# Patient Record
Sex: MALE | Race: OTHER | Employment: FULL TIME | URBAN - METROPOLITAN AREA
[De-identification: names, ages, dates, MRNs, and addresses within clinical notes are randomized per-mention and may not be internally consistent; named-entity substitution may affect disease eponyms.]

---

## 2017-10-28 ENCOUNTER — HOSPITAL ENCOUNTER (EMERGENCY)
Age: 27
Discharge: HOME | End: 2017-10-28
Attending: EMERGENCY MEDICINE | Admitting: EMERGENCY MEDICINE
Payer: SELF-PAY

## 2017-10-28 ENCOUNTER — APPOINTMENT (OUTPATIENT)
Dept: CT IMAGING | Age: 27
End: 2017-10-28
Attending: PHYSICIAN ASSISTANT
Payer: SELF-PAY

## 2017-10-28 VITALS
HEART RATE: 80 BPM | SYSTOLIC BLOOD PRESSURE: 126 MMHG | RESPIRATION RATE: 16 BRPM | WEIGHT: 139.2 LBS | TEMPERATURE: 98 F | OXYGEN SATURATION: 100 % | DIASTOLIC BLOOD PRESSURE: 79 MMHG

## 2017-10-28 DIAGNOSIS — S05.01XA CONJUNCTIVAL ABRASION, RIGHT, INITIAL ENCOUNTER: Primary | ICD-10-CM

## 2017-10-28 PROCEDURE — 90471 IMMUNIZATION ADMIN: CPT

## 2017-10-28 PROCEDURE — 90715 TDAP VACCINE 7 YRS/> IM: CPT | Performed by: PHYSICIAN ASSISTANT

## 2017-10-28 PROCEDURE — 74011000250 HC RX REV CODE- 250: Performed by: PHYSICIAN ASSISTANT

## 2017-10-28 PROCEDURE — 99282 EMERGENCY DEPT VISIT SF MDM: CPT

## 2017-10-28 PROCEDURE — 70480 CT ORBIT/EAR/FOSSA W/O DYE: CPT

## 2017-10-28 PROCEDURE — 74011250636 HC RX REV CODE- 250/636: Performed by: PHYSICIAN ASSISTANT

## 2017-10-28 RX ORDER — TRAMADOL HYDROCHLORIDE 50 MG/1
50 TABLET ORAL
Qty: 5 TAB | Refills: 0 | Status: SHIPPED | OUTPATIENT
Start: 2017-10-28

## 2017-10-28 RX ORDER — POLYMYXIN B SULFATE AND TRIMETHOPRIM 1; 10000 MG/ML; [USP'U]/ML
1 SOLUTION OPHTHALMIC
Qty: 10 ML | Refills: 0 | Status: SHIPPED | OUTPATIENT
Start: 2017-10-28 | End: 2017-11-02

## 2017-10-28 RX ORDER — TETRACAINE HYDROCHLORIDE 5 MG/ML
1 SOLUTION OPHTHALMIC
Status: COMPLETED | OUTPATIENT
Start: 2017-10-28 | End: 2017-10-28

## 2017-10-28 RX ADMIN — TETRACAINE HYDROCHLORIDE 1 DROP: 5 SOLUTION OPHTHALMIC at 10:27

## 2017-10-28 RX ADMIN — TETANUS TOXOID, REDUCED DIPHTHERIA TOXOID AND ACELLULAR PERTUSSIS VACCINE, ADSORBED 0.5 ML: 5; 2.5; 8; 8; 2.5 SUSPENSION INTRAMUSCULAR at 11:02

## 2017-10-28 RX ADMIN — FLUORESCEIN SODIUM 1 STRIP: 1 STRIP OPHTHALMIC at 10:27

## 2017-10-28 NOTE — ED TRIAGE NOTES
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 Denies blurred vision or pain.  #[id number].

## 2017-10-28 NOTE — ED NOTES
PA reviewed discharge instructions with the patient using interpretor phone. The patient verbalized understanding.  Patient left before DC vitals were obtained

## 2017-10-28 NOTE — ED PROVIDER NOTES
HPI Comments: 33 y.o. male with no significant past medical history who presents from Home with chief complaint of Right Eye Pain after Injury. Patient states \"around 1500\" yesterday\" he was cutting a piece of marble\" when he felt \"something go into his right eye\". Pt states immediate onset of right eye pain, redness, and increased watering from right eye. Pt reports constant moderate right eye pain described as \"burning\". Pt reports he has been taking an unknown OTC eye drops with no relief. Pt denies visual disturbance. Pt denies fever, chills, cough, congestion, SOB, chest pain, abdominal pain, nausea, vomiting, diarrhea, difficulty urinating, or dysuria. Pt denies any other acute medical complaints. Last tetanus: unknown. There are no other acute medical concerns at this time. PCP: No primary care provider on file. Note written by Yasmani Olea, as dictated by RALEIGH Reece 9:55 AM    The history is provided by the patient. The history is limited by a language barrier. A  was used ([employer]). History reviewed. No pertinent past medical history. History reviewed. No pertinent surgical history. History reviewed. No pertinent family history. Social History     Social History    Marital status: N/A     Spouse name: N/A    Number of children: N/A    Years of education: N/A     Occupational History    Not on file. Social History Main Topics    Smoking status: Never Smoker    Smokeless tobacco: Never Used    Alcohol use No    Drug use: Not on file    Sexual activity: Not on file     Other Topics Concern    Not on file     Social History Narrative    No narrative on file         ALLERGIES: Review of patient's allergies indicates no known allergies. Review of Systems   Constitutional: Negative for chills and fever. HENT: Negative for congestion. Eyes: Positive for discharge and redness.  Negative for photophobia and visual disturbance. Respiratory: Negative for shortness of breath. Cardiovascular: Negative for chest pain. Gastrointestinal: Negative for abdominal pain, diarrhea, nausea and vomiting. Genitourinary: Negative for dysuria. Musculoskeletal: Negative for neck stiffness. Skin: Negative for rash. Neurological: Negative for headaches. All other systems reviewed and are negative. Vitals:    10/28/17 0936   BP: 126/79   Pulse: 80   Resp: 16   Temp: 98 °F (36.7 °C)   SpO2: 100%   Weight: 63.1 kg (139 lb 3.2 oz)            Physical Exam   Constitutional: He is oriented to person, place, and time. He appears well-developed and well-nourished. No distress. Pleasant  male   HENT:   Head: Normocephalic and atraumatic. Right Ear: External ear normal.   Left Ear: External ear normal.   Eyes: Right eye exhibits discharge. No scleral icterus. RIGHT EYE: + conjunctival injection and tearing. No obvious foreign body. No ciliary flush pattern. Pupil reactive. No pain with consensual pupil response. On fluorescein stain, small area of uptake just medial to the cornea   Neck: Neck supple. No tracheal deviation present. Cardiovascular: Normal rate, regular rhythm and normal heart sounds. Exam reveals no gallop and no friction rub. No murmur heard. Pulmonary/Chest: Effort normal and breath sounds normal. No stridor. No respiratory distress. He has no wheezes. Abdominal: Soft. He exhibits no distension. Musculoskeletal: Normal range of motion. Neurological: He is alert and oriented to person, place, and time. Skin: Skin is warm and dry. Psychiatric: He has a normal mood and affect. His behavior is normal.   Nursing note and vitals reviewed.      Note written by Yasmani Biggs, as dictated by RALEIGH Chen 9:55 AM    MDM  Number of Diagnoses or Management Options  Conjunctival abrasion, right, initial encounter:   Diagnosis management comments: 33year old male presenting to the ED for eye pain that started acutely yesterday while cutting marble, c/f corneal abrasion, laceration, retained FB.  + injection on exam with watery drainage, vision 20/20. Small area of fluorescein uptake, given white color of marble CT obtained as exam was somewhat difficult to ascertain presence of FB, no FB noted on scan. Discusses results, care instructions, return precautions, ophtho f/u with pt using  phone to ensure understanding, all questions answered.          Amount and/or Complexity of Data Reviewed  Tests in the radiology section of CPT®: ordered and reviewed  Discuss the patient with other providers: yes (Dr. Mone Real, ED attending)      ED Course       Procedures

## 2017-10-28 NOTE — DISCHARGE INSTRUCTIONS
Rasguños en la córnea: Instrucciones de cuidado - [ Corneal Scratches: Care Instructions ]  Instrucciones de cuidado    La córnea es la superficie ondina que cubre la parte delantera del tino. Cuando entra heide pequeña partícula de [place], heide astilla de [place], un insecto u otro objeto en el tino, [place] producir un rasguño doloroso en la córnea. Usar lentes de contacto por mucho tiempo o frotarse los ojos también puede rasguñar la córnea. Los rasguños pequeños generalmente se curan en [facility]. Los rasguños más profundos podrían tardar más en curarse. Si le extrajeron un objeto extraño del tino o si tiene un rasguño en la córnea, será necesario que esté alerta a las infecciones y los problemas de [address] tino se [place]. La atención de seguimiento es heide parte clave de stallworth tratamiento y seguridad. Asegúrese de hacer y acudir a todas las citas, y llame a stallworth médico si está teniendo problemas. También es heide buena idea saber los resultados de los exámenes y mantener heide lista de los medicamentos que william. ¿Cómo puede cuidarse en el hogar? · Probablemente el médico usó algún medicamento josias stallworth examen para adormecer el tino. Cuando pasa stallworth efecto en cuestión de 30 a 60 minutos, el dolor en el tino podría regresar. [place] los analgésicos (medicamentos para el dolor) exactamente alyson le fueron indicados. ¨ Si el médico le recetó un analgésico, tómelo según las indicaciones. ¨ Si no está tomando un analgésico recetado, pregúntele a stallworth médico si puede sima uriel de [employer]. ¨ No tome dos o más analgésicos al mismo tiempo a menos que el médico se lo haya indicado. Muchos analgésicos contienen acetaminofén, es decir, Tylenol. El exceso de acetaminofén (Tylenol) puede ser dañino. · No se frote el tino lesionado. Frotarlo puede empeorarlo. · Use las gotas o la pomada para los ojos recetadas según las indicaciones. Asegúrese de que la punta del gotero o del frasco esté limpia.  Para ponerse pomada o gotas para los ojos:  ¨ Incline la [place] atrás y, con un dedo, baje el párpado inferior. ¨ Deje caer las gotas o un chorrito del medicamento dentro del párpado inferior. ¨ Cierre el tino josias 30 a 61 segundos para permitir que las gotas o la pomada se esparzan. ¨ No permita que la punta de la pomada o del gotero toque darryn pestañas ni ninguna otra superficie. · No use lentes de contacto en el tino lastimado hasta que stallworth médico lo autorice. Además, no use maquillaje para los ojos [employer] tino se haya curado. · No conduzca si tiene visión borrosa. · La swapnil brillante podría provocar dolor. Los anteojos de sol pueden ayudar. · Para prevenir futuras lesiones en los ojos, use lentes de seguridad o de protección cuando trabaje con máquinas o herramientas, martha el pasto o monte en bicicleta o motocicleta. ¿Cuándo debe pedir ayuda? Llame a stallworth médico ahora mismo o busque atención médica inmediata si:  ? · Tiene señales de heide infección en el tino, tales alyson:  ¨ Pus o heide secreción espesa que sale del tino. ¨ Enrojecimiento o hinchazón alrededor del tino. Aram Quinn. ? · Tiene nuevo o peor dolor en el tino. ? · Tiene cambios en la vista. ? · Tiene la sensación de [employer]. ? · La swapnil le provoca dolor en el tino. ?Preste especial atención a los cambios en stallworth gianna y asegúrese de comunicarse con stallworth médico si:  ? · No mejora alyson se esperaba. ¿Dónde puede encontrar más información en inglés? Loetta Budd a http://xiomy.info/. Jodi Kilgore A200 en la búsqueda para aprender más acerca de \"Rasguños en la córnea: Instrucciones de cuidado - [ Corneal Scratches: Care Instructions ]. \"  Revisado: 3 Alice Montiel 2017  Versión del contenido: 11.4  © 6097-5499 [employer], [employer]. Las instrucciones de cuidado fueron adaptadas bajo licencia por [employer] (which disclaims liability or warranty for this information).  Si usted tiene [place] afección médica o sobre estas instrucciones, siempre pregunte a stallworth profesional de gianna. [facility] niega toda garantía o responsabilidad por stallworth uso de esta información.